# Patient Record
Sex: MALE | Race: ASIAN | NOT HISPANIC OR LATINO | Employment: FULL TIME | URBAN - METROPOLITAN AREA
[De-identification: names, ages, dates, MRNs, and addresses within clinical notes are randomized per-mention and may not be internally consistent; named-entity substitution may affect disease eponyms.]

---

## 2020-01-31 PROCEDURE — 99284 EMERGENCY DEPT VISIT MOD MDM: CPT

## 2020-01-31 PROCEDURE — 93005 ELECTROCARDIOGRAM TRACING: CPT

## 2020-01-31 PROCEDURE — 93005 ELECTROCARDIOGRAM TRACING: CPT | Performed by: EMERGENCY MEDICINE

## 2020-02-01 ENCOUNTER — HOSPITAL ENCOUNTER (EMERGENCY)
Facility: MEDICAL CENTER | Age: 30
End: 2020-02-01
Attending: EMERGENCY MEDICINE

## 2020-02-01 VITALS
RESPIRATION RATE: 16 BRPM | BODY MASS INDEX: 25.88 KG/M2 | HEIGHT: 70 IN | TEMPERATURE: 97.7 F | WEIGHT: 180.78 LBS | HEART RATE: 84 BPM | SYSTOLIC BLOOD PRESSURE: 118 MMHG | DIASTOLIC BLOOD PRESSURE: 78 MMHG | OXYGEN SATURATION: 99 %

## 2020-02-01 DIAGNOSIS — R55 SYNCOPE, UNSPECIFIED SYNCOPE TYPE: ICD-10-CM

## 2020-02-01 LAB
ANION GAP SERPL CALC-SCNC: 10 MMOL/L (ref 0–11.9)
BASOPHILS # BLD AUTO: 0.3 % (ref 0–1.8)
BASOPHILS # BLD: 0.02 K/UL (ref 0–0.12)
BUN SERPL-MCNC: 12 MG/DL (ref 8–22)
CALCIUM SERPL-MCNC: 9.9 MG/DL (ref 8.5–10.5)
CHLORIDE SERPL-SCNC: 98 MMOL/L (ref 96–112)
CO2 SERPL-SCNC: 27 MMOL/L (ref 20–33)
CREAT SERPL-MCNC: 0.87 MG/DL (ref 0.5–1.4)
EKG IMPRESSION: NORMAL
EOSINOPHIL # BLD AUTO: 0.16 K/UL (ref 0–0.51)
EOSINOPHIL NFR BLD: 2.4 % (ref 0–6.9)
ERYTHROCYTE [DISTWIDTH] IN BLOOD BY AUTOMATED COUNT: 37.4 FL (ref 35.9–50)
GLUCOSE SERPL-MCNC: 104 MG/DL (ref 65–99)
HCT VFR BLD AUTO: 43.3 % (ref 42–52)
HGB BLD-MCNC: 15.3 G/DL (ref 14–18)
IMM GRANULOCYTES # BLD AUTO: 0.01 K/UL (ref 0–0.11)
IMM GRANULOCYTES NFR BLD AUTO: 0.2 % (ref 0–0.9)
LYMPHOCYTES # BLD AUTO: 1.55 K/UL (ref 1–4.8)
LYMPHOCYTES NFR BLD: 23.6 % (ref 22–41)
MCH RBC QN AUTO: 30.8 PG (ref 27–33)
MCHC RBC AUTO-ENTMCNC: 35.3 G/DL (ref 33.7–35.3)
MCV RBC AUTO: 87.1 FL (ref 81.4–97.8)
MONOCYTES # BLD AUTO: 0.55 K/UL (ref 0–0.85)
MONOCYTES NFR BLD AUTO: 8.4 % (ref 0–13.4)
NEUTROPHILS # BLD AUTO: 4.27 K/UL (ref 1.82–7.42)
NEUTROPHILS NFR BLD: 65.1 % (ref 44–72)
NRBC # BLD AUTO: 0 K/UL
NRBC BLD-RTO: 0 /100 WBC
PLATELET # BLD AUTO: 227 K/UL (ref 164–446)
PMV BLD AUTO: 8.2 FL (ref 9–12.9)
POTASSIUM SERPL-SCNC: 3.9 MMOL/L (ref 3.6–5.5)
RBC # BLD AUTO: 4.97 M/UL (ref 4.7–6.1)
SODIUM SERPL-SCNC: 135 MMOL/L (ref 135–145)
WBC # BLD AUTO: 6.6 K/UL (ref 4.8–10.8)

## 2020-02-01 PROCEDURE — 36415 COLL VENOUS BLD VENIPUNCTURE: CPT

## 2020-02-01 PROCEDURE — 80048 BASIC METABOLIC PNL TOTAL CA: CPT

## 2020-02-01 PROCEDURE — 700105 HCHG RX REV CODE 258: Performed by: EMERGENCY MEDICINE

## 2020-02-01 PROCEDURE — 85025 COMPLETE CBC W/AUTO DIFF WBC: CPT

## 2020-02-01 RX ORDER — SODIUM CHLORIDE, SODIUM LACTATE, POTASSIUM CHLORIDE, CALCIUM CHLORIDE 600; 310; 30; 20 MG/100ML; MG/100ML; MG/100ML; MG/100ML
1000 INJECTION, SOLUTION INTRAVENOUS ONCE
Status: COMPLETED | OUTPATIENT
Start: 2020-02-01 | End: 2020-02-01

## 2020-02-01 RX ADMIN — SODIUM CHLORIDE, POTASSIUM CHLORIDE, SODIUM LACTATE AND CALCIUM CHLORIDE 1000 ML: 600; 310; 30; 20 INJECTION, SOLUTION INTRAVENOUS at 00:41

## 2020-02-01 ASSESSMENT — LIFESTYLE VARIABLES
HAVE PEOPLE ANNOYED YOU BY CRITICIZING YOUR DRINKING: NO
TOTAL SCORE: 0
EVER FELT BAD OR GUILTY ABOUT YOUR DRINKING: NO
TOTAL SCORE: 0
EVER HAD A DRINK FIRST THING IN THE MORNING TO STEADY YOUR NERVES TO GET RID OF A HANGOVER: NO
DO YOU DRINK ALCOHOL: YES
HAVE YOU EVER FELT YOU SHOULD CUT DOWN ON YOUR DRINKING: NO
TOTAL SCORE: 0
CONSUMPTION TOTAL: INCOMPLETE

## 2020-02-01 NOTE — ED NOTES
Pt ambulated with steady gait, asymptomatic. Discharge education provided. Discharge paperwork signed and given to pt. All questions answered.

## 2020-02-01 NOTE — ED TRIAGE NOTES
"Chief Complaint   Patient presents with   • Syncope     pt flew from [place] to here today and while on the flight pt reports having a syncopal episode. pt does not remember much of the episode     Pt ambulatory to triage for above complaint. Pt denies any pain at this time or any n/v. Pt states the the  guided him to the ground and told him his color was \" very pale\". Pt does not remember much of the event but recalls waking up on the ground.   EKG done.    /81   Pulse 86   Temp 36.5 °C (97.7 °F) (Temporal)   Resp 17   Ht 1.78 m (5' 10.08\")   Wt 82 kg (180 lb 12.4 oz)   SpO2 97%   BMI 25.88 kg/m²     "

## 2020-02-01 NOTE — ED PROVIDER NOTES
"ED Provider Note    CHIEF COMPLAINT  Chief Complaint   Patient presents with   • Syncope     pt flew from [place] to here today and while on the flight pt reports having a syncopal episode. pt does not remember much of the episode       HPI  Philly Hinojosa is a 29 y.o. male who presents following a syncopal event while on a flight from [place].  He states that he was caught before he sustained any traumatic injury.  Had a prodrome of lightheadedness.  He did note that he had a few alcoholic beverages prior to the flight.  He does not remember the entire episode.  He was checked by another passenger who was a physician.  Blood glucose was unremarkable.  Normal vitals at that time.  Patient was sent here for further evaluation.  No prior history of syncope except for one other time when he obtained an injection.  Denies known significant family history of heart disease.  No chest pain or trouble breathing.  No lower extremity swelling.  No known history or family history of bleeding or clotting abnormalities.  No fevers or recent illness.  No vomiting or diarrhea.    REVIEW OF SYSTEMS  See HPI for further details. All other systems are negative.     PAST MEDICAL HISTORY       SOCIAL HISTORY  Social History     Tobacco Use   • Smoking status: Never Smoker   • Smokeless tobacco: Never Used   Substance and Sexual Activity   • Alcohol use: Yes     Comment: occasionally   • Drug use: Never   • Sexual activity: Not on file       SURGICAL HISTORY  patient denies any surgical history    CURRENT MEDICATIONS  Home Medications     Reviewed by Fina Jones R.N. (Registered Nurse) on 01/31/20 at 2637  Med List Status: Partial   Medication Last Dose Status        Patient Abraham Taking any Medications                       ALLERGIES  No Known Allergies    PHYSICAL EXAM  VITAL SIGNS: /91   Pulse 82   Temp 36.5 °C (97.7 °F) (Temporal)   Resp 16   Ht 1.78 m (5' 10.08\")   Wt 82 kg (180 lb 12.4 oz)   SpO2 98%   BMI 25.88 kg/m² "   Pulse ox interpretation: I interpret this pulse ox as normal.  Constitutional: Alert in no apparent distress.  HENT: No signs of trauma, Bilateral external ears normal, Nose normal.   Eyes: Pupils are equal and reactive, Conjunctiva normal, Non-icteric.   Neck: Normal range of motion, No tenderness, Supple, No stridor.   Cardiovascular: Regular rate and rhythm.   Thorax & Lungs: Normal breath sounds, No respiratory distress, No wheezing  Abdomen: Bowel sounds normal, Soft, No tenderness, No masses, No pulsatile masses. No peritoneal signs.  Skin: Warm, Dry, No erythema, No rash.   Back: No bony tenderness, No CVA tenderness.   Extremities: Intact distal pulses, No edema, No tenderness, No cyanosis  Neurologic: Alert, Normal motor function and gait, Normal sensory function, No focal deficits noted.       DIAGNOSTIC STUDIES / PROCEDURES    EKG - Physician interpretation  Results for orders placed or performed during the hospital encounter of 20   EKG   Result Value Ref Range    Report       [facility] Emergency Dept[facility]    Test Date:  2020  Pt Name:    MARIANNE TANG                    Department: ER  MRN:        [id number]                      Room:  Gender:     Male                         Technician: [id number]  :        1990                   Requested By:ER TRIAGE PROTOCOL  Order #:    [id number]                    Reading MD: MAGDY ZARCO MD    Measurements  Intervals                                Axis  Rate:       85                           P:          12  AR:         183                          QRS:        75  QRSD:       101                          T:          28  QT:         352  QTc:        419    Interpretive Statements  Sinus rhythm  ST elev, probable normal early repol pattern  No previous ECG available for comparison  Electronically Signed On 2020 0:38:00 PST by MAGDY ZARCO MD         LABS  Labs Reviewed   CBC WITH DIFFERENTIAL - Abnormal; Notable for the following  components:       Result Value    MPV 8.2 (*)     All other components within normal limits   BASIC METABOLIC PANEL - Abnormal; Notable for the following components:    Glucose 104 (*)     All other components within normal limits   ESTIMATED GFR         RADIOLOGY  No orders to display         COURSE & MEDICAL DECISION MAKING    Medications   lactated ringers infusion (BOLUS) (0 mL Intravenous Stopped 2/1/20 0122)       Pertinent Labs & Imaging studies reviewed. (See chart for details)  29 y.o. male presenting with syncopal event.  Had a prodrome of lightheadedness.  No chest pain or trouble breathing.  No significant risk factors for pulmonary embolus and PERC negative.  A heart score of 0.  No sudden onset thunderclap headache.  No vomiting.  No recent illness or fevers.  Normal vitals upon arrival.  No signs of anemia.  EKG without signs of WPW, Brugada, hypertrophic obstructive cardiomyopathy, prolonged QTC.  No signs of arrhythmia or acute ischemia.  Laboratory studies were performed that were largely unremarkable.  No anemia or leukocytosis.  No significant metabolic abnormalities.  There was concern for possible dehydration as a cause of the patient's syncopal event and so he was given IV fluid hydration.  He is stable upon reevaluation.  Steady gait and stable for discharge.  Unclear etiology for the patient's syncopal event today.  Recommending outpatient management.  I do not believe that the patient would benefit from further inpatient evaluation at this time.  He was discharged home in stable condition.    HYDRATION: Based on the patient's presentation of Dehydration the patient was given IV fluids. IV Hydration was used because oral hydration was not as rapid as required. Upon recheck following hydration, the patient was improved.    The patient was instructed to follow-up with primary care physician for further management.  To return immediately for any worsening symptoms or development of any other  "concerning signs or symptoms. The patient verbalizes understanding in their own words.    /78   Pulse 84   Temp 36.5 °C (97.7 °F) (Temporal)   Resp 16   Ht 1.78 m (5' 10.08\")   Wt 82 kg (180 lb 12.4 oz)   SpO2 99%   BMI 25.88 kg/m²     The patient was referred to primary care where they will receive further BP management.      [facility], Emergency Dept  [address] [place] [id number] [phone]    As needed, If symptoms worsen    Primary care doctor    Schedule an appointment as soon as possible for a visit         FINAL IMPRESSION  1. Syncope, unspecified syncope type            Electronically signed by: Faustino Patterson M.D., 2/1/2020 12:20 AM    "

## 2021-06-09 ENCOUNTER — HOSPITAL ENCOUNTER (OUTPATIENT)
Facility: MEDICAL CENTER | Age: 31
End: 2021-06-09
Payer: COMMERCIAL

## 2021-06-09 LAB — COVID ORDER STATUS COVID19: NORMAL

## 2021-06-10 LAB
SARS-COV-2 RNA RESP QL NAA+PROBE: NOTDETECTED
SPECIMEN SOURCE: NORMAL